# Patient Record
Sex: FEMALE | Race: WHITE | NOT HISPANIC OR LATINO | Employment: UNEMPLOYED | URBAN - NONMETROPOLITAN AREA
[De-identification: names, ages, dates, MRNs, and addresses within clinical notes are randomized per-mention and may not be internally consistent; named-entity substitution may affect disease eponyms.]

---

## 2023-09-06 ENCOUNTER — OFFICE VISIT (OUTPATIENT)
Dept: CARDIOLOGY | Facility: CLINIC | Age: 58
End: 2023-09-06
Payer: COMMERCIAL

## 2023-09-06 VITALS
BODY MASS INDEX: 42.28 KG/M2 | SYSTOLIC BLOOD PRESSURE: 149 MMHG | HEART RATE: 69 BPM | WEIGHT: 279 LBS | HEIGHT: 68 IN | OXYGEN SATURATION: 97 % | DIASTOLIC BLOOD PRESSURE: 78 MMHG

## 2023-09-06 DIAGNOSIS — I10 HYPERTENSION, UNSPECIFIED TYPE: ICD-10-CM

## 2023-09-06 DIAGNOSIS — G47.33 OSA (OBSTRUCTIVE SLEEP APNEA): Primary | ICD-10-CM

## 2023-09-06 RX ORDER — METOPROLOL TARTRATE 100 MG/1
1 TABLET ORAL EVERY 12 HOURS SCHEDULED
COMMUNITY
Start: 2023-07-07

## 2023-09-06 RX ORDER — INSULIN LISPRO 100 [IU]/ML
INJECTION, SUSPENSION SUBCUTANEOUS
COMMUNITY
Start: 2023-08-16

## 2023-09-06 RX ORDER — FLURBIPROFEN SODIUM 0.3 MG/ML
SOLUTION/ DROPS OPHTHALMIC SEE ADMIN INSTRUCTIONS
COMMUNITY
Start: 2023-08-04

## 2023-09-06 RX ORDER — PRAVASTATIN SODIUM 80 MG/1
80 TABLET ORAL
COMMUNITY
Start: 2023-07-07

## 2023-09-06 RX ORDER — OLMESARTAN MEDOXOMIL 40 MG/1
1 TABLET ORAL DAILY
COMMUNITY
Start: 2023-07-07

## 2023-09-06 RX ORDER — LEVOTHYROXINE SODIUM 0.05 MG/1
1 TABLET ORAL DAILY
COMMUNITY
Start: 2023-07-07

## 2023-09-06 RX ORDER — AMLODIPINE BESYLATE 10 MG/1
1 TABLET ORAL DAILY
COMMUNITY
Start: 2023-07-07

## 2023-09-06 RX ORDER — FLUTICASONE PROPIONATE 50 MCG
2 SPRAY, SUSPENSION (ML) NASAL DAILY
COMMUNITY

## 2023-09-06 RX ORDER — ASPIRIN 81 MG/1
81 TABLET, CHEWABLE ORAL DAILY
COMMUNITY

## 2023-09-06 NOTE — ASSESSMENT & PLAN NOTE
Patient is doing very well on PAP therapy with good control and compliance.  Download reviewed and interpreted.  Compliance is 100%, AHI is 1.5.  We will continue PAP therapy at current settings.  - Follow-up in 1 year.

## 2023-09-06 NOTE — PROGRESS NOTES
Follow-Up Sleep Consult     Date:   2023  Name: Loree Ledesma  :   1965  PCP: Cheri Arceo APRN    Chief Complaint   Patient presents with    Sleep Apnea       Subjective     History of Present Illness  Loree Ledesma is a 58 y.o. female who presents today for follow-up on ANMOL.  Patient has a history of ANMOL with baseline AHI of 18.  Download reviewed and interpreted today.  Compliance is 100%, when used >4 hours is 100%.  AHI 0.5.  She is using a nasal mask and doing well with that.  Airflow is comfortable.  She denies excessive daytime sleepiness or fatigue.  Patient is doing very well on PAP therapy with good control and compliance.  Any new concerns or complaints today.  Denies chest pain, shortness of breath, palpitations, dizziness or syncope.    Cardiac/sleep history  1.  Hypertension  2.  ANMOL with baseline AHI of 18.  Currently on auto CPAP settings 9-16 cm.  3.  Hyperlipidemia    History of ANMOL with a baseline AHI of 18.     Current Treatment: CPAP settings 9-16 cm  Current mask used is nasal cushion mask     Device Functioning Well: Yes  Mask Fit Comfortable: Yes  Air Flow Comfortable: Yes  DME Helpful for Supplies: Yes  Sleep is rested: Yes      Device Download:           The patient's relevant past medical, surgical, family, and social history reviewed and updated in Epic as appropriate.    Past Medical History:   Diagnosis Date    Cyst of left ovary     DM (diabetes mellitus)     GERD (gastroesophageal reflux disease)     Gout     HTN (hypertension)     Hypercholesterolemia     ANMOL (obstructive sleep apnea)      Past Surgical History:   Procedure Laterality Date    HERNIA REPAIR      HYSTERECTOMY       OB History    No obstetric history on file.       Allergies   Allergen Reactions    Hydrochlorothiazide Irritability     Prior to Admission medications    Medication Sig Start Date End Date Taking? Authorizing Provider   amLODIPine (NORVASC) 10 MG tablet Take 1 tablet by mouth Daily.  "7/7/23  Yes Carmen Santa MD B-D UF III MINI PEN NEEDLES 31G X 5 MM misc See Admin Instructions. 8/4/23  Yes Carmen Santa MD   HumaLOG Mix 75/25 KwikPen (75-25) 100 UNIT/ML suspension pen-injector pen INJECT 40 UNITS SUBCUTANEOUSLY IN THE MORNING AND 38 IN THE EVENING 8/16/23  Yes Carmen Santa MD   levothyroxine (SYNTHROID, LEVOTHROID) 50 MCG tablet Take 1 tablet by mouth Daily. 7/7/23  Yes Provider, Historical, MD   metFORMIN (GLUCOPHAGE) 500 MG tablet Take 1 tablet by mouth Every 12 (Twelve) Hours. 7/7/23  Yes Provider, Historical, MD   metoprolol tartrate (LOPRESSOR) 100 MG tablet Take 1 tablet by mouth Every 12 (Twelve) Hours. 7/7/23  Yes Provider, Historical, MD   olmesartan (BENICAR) 40 MG tablet Take 1 tablet by mouth Daily. 7/7/23  Yes Provider, Historical, MD   pravastatin (PRAVACHOL) 80 MG tablet Take 1 tablet by mouth every night at bedtime. 7/7/23  Yes Carmen Santa MD     Family History   Problem Relation Age of Onset    Cancer Mother     No Known Problems Father        Objective     Vital Signs:  /78   Pulse 69   Ht 172.7 cm (68\")   Wt 127 kg (279 lb)   SpO2 97%   BMI 42.42 kg/m²     BMI cannot be calculated due to outdated height or weight values.  Please input a current height/weight in Vitals and re-renter [id number] in Note to pull in correct documentation based on BMI range.        Physical Exam  Vitals reviewed.   Constitutional:       Appearance: Normal appearance.   HENT:      Head: Normocephalic.   Cardiovascular:      Rate and Rhythm: Normal rate and regular rhythm.      Heart sounds: Normal heart sounds.   Pulmonary:      Effort: Pulmonary effort is normal.      Breath sounds: Normal breath sounds.   Musculoskeletal:      Right lower leg: No edema.      Left lower leg: No edema.   Skin:     General: Skin is warm and dry.      Capillary Refill: Capillary refill takes less than 2 seconds.   Neurological:      General: No focal deficit present.    "   Mental Status: She is alert and oriented to person, place, and time.   Psychiatric:         Mood and Affect: Mood normal.         Behavior: Behavior normal.           PAP download reviewed: 8/6/2023 - 9/4/2023.         Assessment and Plan     Diagnoses and all orders for this visit:    1. ANMOL (obstructive sleep apnea) (Primary)  Assessment & Plan:  Patient is doing very well on PAP therapy with good control and compliance.  Download reviewed and interpreted.  Compliance is 100%, AHI is 1.5.  We will continue PAP therapy at current settings.  - Follow-up in 1 year.    Orders:  -     PAP Therapy    2. Hypertension, unspecified type  Assessment & Plan:  Hypertension is  stable .  Slightly elevated today but patient reports normal readings at home.  Continue current treatment regimen.  Dietary sodium restriction.  Ambulatory blood pressure monitoring.  Blood pressure will be reassessed at the next regular appointment.          Report if any new/changing symptoms immediately         Follow Up  Return in about 1 year (around 9/6/2024) for ANMOL.  Patient was given instructions and counseling regarding her condition or for health maintenance advice. Please see specific information pulled into the AVS if appropriate.

## 2023-09-06 NOTE — ASSESSMENT & PLAN NOTE
Hypertension is  stable .  Slightly elevated today but patient reports normal readings at home.  Continue current treatment regimen.  Dietary sodium restriction.  Ambulatory blood pressure monitoring.  Blood pressure will be reassessed at the next regular appointment.

## 2024-09-10 ENCOUNTER — OFFICE VISIT (OUTPATIENT)
Dept: CARDIOLOGY | Facility: CLINIC | Age: 59
End: 2024-09-10
Payer: COMMERCIAL

## 2024-09-10 VITALS
WEIGHT: 281 LBS | OXYGEN SATURATION: 97 % | BODY MASS INDEX: 42.59 KG/M2 | HEART RATE: 73 BPM | DIASTOLIC BLOOD PRESSURE: 72 MMHG | HEIGHT: 68 IN | SYSTOLIC BLOOD PRESSURE: 134 MMHG

## 2024-09-10 DIAGNOSIS — G47.33 OSA (OBSTRUCTIVE SLEEP APNEA): Primary | Chronic | ICD-10-CM

## 2024-09-10 PROCEDURE — 3075F SYST BP GE 130 - 139MM HG: CPT | Performed by: NURSE PRACTITIONER

## 2024-09-10 PROCEDURE — 99213 OFFICE O/P EST LOW 20 MIN: CPT | Performed by: NURSE PRACTITIONER

## 2024-09-10 PROCEDURE — 1160F RVW MEDS BY RX/DR IN RCRD: CPT | Performed by: NURSE PRACTITIONER

## 2024-09-10 PROCEDURE — 3078F DIAST BP <80 MM HG: CPT | Performed by: NURSE PRACTITIONER

## 2024-09-10 PROCEDURE — 1159F MED LIST DOCD IN RCRD: CPT | Performed by: NURSE PRACTITIONER

## 2024-09-10 RX ORDER — PEN NEEDLE, DIABETIC 32GX 5/32"
1 NEEDLE, DISPOSABLE MISCELLANEOUS 3 TIMES DAILY
COMMUNITY
Start: 2024-08-12

## 2024-09-10 RX ORDER — FUROSEMIDE 20 MG
20 TABLET ORAL DAILY
COMMUNITY
Start: 2024-09-09

## 2024-09-10 RX ORDER — MAGNESIUM OXIDE 400 MG/1
400 TABLET ORAL DAILY
COMMUNITY

## 2024-09-10 RX ORDER — ALLOPURINOL 100 MG/1
100 TABLET ORAL
COMMUNITY
Start: 2024-03-30

## 2024-09-10 NOTE — PROGRESS NOTES
Follow-Up Sleep Consult     Date:   09/10/2024  Name: Loree Ledesma  :   1965  PCP: Cheri Arceo APRN    Chief Complaint   Patient presents with    Sleep Apnea       Subjective     History of Present Illness  Loree Ledesma is a 59 y.o. female who presents today for follow-up on ANMOL.    Patient states she has been doing very well on her PAP therapy.  She states that she has no real complaints.  She states that her sleep is rested.  Airflow and mask fit are comfortable.      Cardiac/sleep history  1.  Hypertension    2.  ANMOL 19 with baseline AHI of 18/53 REM.      Titration study 19 Titrated to Cpap 11-16 cm    Currently on auto CPAP settings 9-16 cm.    3.  Hyperlipidemia    Current mask used is nasal cushion     Device Functioning Well: Yes  Mask Fit Comfortable: Yes  Air Flow Comfortable: Yes  DME Helpful for Supplies: Yes  Sleep is rested: Yes        Device Download:                 The patient's relevant past medical, surgical, family, and social history reviewed and updated in Epic as appropriate.    Past Medical History:   Diagnosis Date    Cyst of left ovary     DM (diabetes mellitus)     GERD (gastroesophageal reflux disease)     Gout     HTN (hypertension)     Hypercholesterolemia     ANMOL (obstructive sleep apnea)      Past Surgical History:   Procedure Laterality Date    HERNIA REPAIR      HYSTERECTOMY       OB History    No obstetric history on file.       Allergies   Allergen Reactions    Hydrochlorothiazide Irritability     Prior to Admission medications    Medication Sig Start Date End Date Taking? Authorizing Provider   allopurinol (ZYLOPRIM) 100 MG tablet Take 1 tablet by mouth. 3/30/24  Yes Carmen Santa MD   amLODIPine (NORVASC) 10 MG tablet Take 1 tablet by mouth Daily. 23  Yes Carmen Santa MD   aspirin 81 MG chewable tablet Chew 1 tablet Daily.   Yes Carmen Santa MD   BD Pen Needle Ann-Marie 2nd Gen 32G X 4 MM misc 1 each by Other route 3 (Three)  "Times a Day. 8/12/24  Yes Carmen Santa MD   fluticasone (FLONASE) 50 MCG/ACT nasal spray 2 sprays into the nostril(s) as directed by provider Daily.   Yes Carmen Santa MD   furosemide (LASIX) 20 MG tablet Take 1 tablet by mouth Daily. 9/9/24  Yes Carmen Santa MD   HumaLOG Mix 75/25 KwikPen (75-25) 100 UNIT/ML suspension pen-injector pen INJECT 40 UNITS SUBCUTANEOUSLY IN THE MORNING AND 38 IN THE EVENING 8/16/23  Yes Carmen Santa MD   levothyroxine (SYNTHROID, LEVOTHROID) 50 MCG tablet Take 1 tablet by mouth Daily. 7/7/23  Yes Provider, Historical, MD   magnesium oxide (MAG-OX) 400 MG tablet Take 1 tablet by mouth Daily.   Yes Carmen Santa MD   metFORMIN (GLUCOPHAGE) 500 MG tablet Take 1 tablet by mouth Every 12 (Twelve) Hours. 7/7/23  Yes Provider, Historical, MD   metoprolol tartrate (LOPRESSOR) 100 MG tablet Take 1 tablet by mouth Every 12 (Twelve) Hours. 7/7/23  Yes Carmen Santa MD   olmesartan (BENICAR) 40 MG tablet Take 1 tablet by mouth Daily. 7/7/23  Yes Provider, Historical, MD   pravastatin (PRAVACHOL) 80 MG tablet Take 1 tablet by mouth every night at bedtime. 7/7/23  Yes Carmen Santa MD B-D UF III MINI PEN NEEDLES 31G X 5 MM misc See Admin Instructions. 8/4/23 9/10/24  Carmen Santa MD     Family History   Problem Relation Age of Onset    Cancer Mother     No Known Problems Father        Objective     Vital Signs:  /72 (BP Location: Right arm, Patient Position: Sitting)   Pulse 73   Ht 172.7 cm (68\")   Wt 127 kg (281 lb)   SpO2 97%   BMI 42.73 kg/m²     Class 3 Severe Obesity (BMI >=40). Obesity-related health conditions include the following: obstructive sleep apnea, hypertension, diabetes mellitus, and dyslipidemias. Obesity is unchanged. BMI is is above average; no BMI management plan is appropriate. We discussed low calorie, low carb based diet program, portion control, and increasing exercise.        Physical " Exam  Constitutional:       Appearance: Normal appearance. She is obese.   Neurological:      General: No focal deficit present.      Mental Status: She is alert and oriented to person, place, and time.   Psychiatric:         Mood and Affect: Mood normal.         Behavior: Behavior normal.         Thought Content: Thought content normal.         Judgment: Judgment normal.         The following data was reviewed by: SHERREI Griffiths on 09/10/2024:    30-day download 8/4/2024 to 9/2/2024 I have reviewed interpret the data from the download.  Download shows good compliance and control.  Patient is receiving benefit from PAP therapy.  Plan to continue current treatment.         Assessment and Plan     Diagnoses and all orders for this visit:    1. ANMOL (obstructive sleep apnea) (Primary)  Assessment & Plan:  Patient has a baseline AHI of 18 that increases to 53 in REM.  This is moderate to severe sleep apnea.  Download shows good compliance and control.  Mask fit and airflow are comfortable.  Patient is receiving benefit from PAP therapy.  Plan to continue current treatment.  Supply order sent to DME of patient choice.    Plan follow-up in 1 year or sooner if any ANMOL or Pap issues or concerns.    Orders:  -     PAP Therapy        Report if any new/changing symptoms immediately, Sleep risks reviewed (driving, medical, sleep death, sedating agents), and Sleep hygiene discussed         Follow Up  Return in about 1 year (around 9/10/2025) for Next scheduled follow up.  Patient was given instructions and counseling regarding her condition or for health maintenance advice. Please see specific information pulled into the AVS if appropriate.

## 2024-09-10 NOTE — ASSESSMENT & PLAN NOTE
Patient has a baseline AHI of 18 that increases to 53 in REM.  This is moderate to severe sleep apnea.  Download shows good compliance and control.  Mask fit and airflow are comfortable.  Patient is receiving benefit from PAP therapy.  Plan to continue current treatment.  Supply order sent to DME of patient choice.    Plan follow-up in 1 year or sooner if any ANMOL or Pap issues or concerns.

## 2024-09-11 ENCOUNTER — PATIENT ROUNDING (BHMG ONLY) (OUTPATIENT)
Dept: CARDIOLOGY | Facility: CLINIC | Age: 59
End: 2024-09-11
Payer: COMMERCIAL

## 2024-09-11 NOTE — PROGRESS NOTES
..My name is Gaby Boyd and I am the Practice Manager for [facility].    I would like to thank you for being a loyal patient. If you do not mind I would like to ask you a few questions about your recent visit with us.  Please feel free to reply if you wish to provide us with feedback on your first visit with our practice.    First, could you tell me what went well with your recent visit?    Secondly, we are always looking for ways to make our patients' experiences even better.  Do you have any recommendations on ways we may improve?    Finally, overall were you satisfied with your first visit to us as a [facility] facility?    In the next few days, you will be receiving a Patient Experience Survey.      Thank you for taking the time to answer a few questions today.  I hope you have a good day.